# Patient Record
Sex: MALE | Race: WHITE | Employment: PART TIME | URBAN - METROPOLITAN AREA
[De-identification: names, ages, dates, MRNs, and addresses within clinical notes are randomized per-mention and may not be internally consistent; named-entity substitution may affect disease eponyms.]

---

## 2018-01-01 ENCOUNTER — TELEPHONE (OUTPATIENT)
Dept: INTERNAL MEDICINE CLINIC | Age: 63
End: 2018-01-01

## 2018-01-01 ENCOUNTER — OFFICE VISIT (OUTPATIENT)
Dept: INTERNAL MEDICINE CLINIC | Age: 63
End: 2018-01-01
Payer: MEDICAID

## 2018-01-01 VITALS
SYSTOLIC BLOOD PRESSURE: 132 MMHG | OXYGEN SATURATION: 97 % | WEIGHT: 315 LBS | DIASTOLIC BLOOD PRESSURE: 78 MMHG | TEMPERATURE: 98.1 F | HEART RATE: 92 BPM | BODY MASS INDEX: 67.15 KG/M2

## 2018-01-01 DIAGNOSIS — I87.2 VENOUS STASIS DERMATITIS OF RIGHT LOWER EXTREMITY: Primary | ICD-10-CM

## 2018-01-01 DIAGNOSIS — L97.909 VENOUS ULCER (HCC): ICD-10-CM

## 2018-01-01 DIAGNOSIS — L97.919 CHRONIC VENOUS HYPERTENSION W/ULCER AND INFLAMMATION INVOLV RIGHT SIDE (HCC): ICD-10-CM

## 2018-01-01 DIAGNOSIS — R60.0 EDEMA LEG: ICD-10-CM

## 2018-01-01 DIAGNOSIS — I83.009 VENOUS ULCER (HCC): ICD-10-CM

## 2018-01-01 DIAGNOSIS — R73.03 PREDIABETES: ICD-10-CM

## 2018-01-01 DIAGNOSIS — L97.909 STASIS ULCER OF LOWER EXTREMITY, UNSPECIFIED LATERALITY (HCC): ICD-10-CM

## 2018-01-01 DIAGNOSIS — I83.009 STASIS ULCER OF LOWER EXTREMITY, UNSPECIFIED LATERALITY (HCC): ICD-10-CM

## 2018-01-01 DIAGNOSIS — I87.331 CHRONIC VENOUS HYPERTENSION W/ULCER AND INFLAMMATION INVOLV RIGHT SIDE (HCC): ICD-10-CM

## 2018-01-01 PROCEDURE — 99213 OFFICE O/P EST LOW 20 MIN: CPT | Performed by: INTERNAL MEDICINE

## 2018-01-01 RX ORDER — FUROSEMIDE 40 MG/1
40 TABLET ORAL DAILY
Qty: 30 TABLET | Refills: 3 | Status: SHIPPED | OUTPATIENT
Start: 2018-01-01

## 2018-01-01 RX ORDER — CEPHALEXIN 500 MG/1
500 CAPSULE ORAL 3 TIMES DAILY
Qty: 30 CAPSULE | Refills: 0 | Status: SHIPPED | OUTPATIENT
Start: 2018-01-01 | End: 2018-01-01

## 2018-01-01 RX ORDER — POTASSIUM CHLORIDE 20 MEQ/1
20 TABLET, EXTENDED RELEASE ORAL DAILY
Qty: 30 TABLET | Refills: 3 | Status: SHIPPED | OUTPATIENT
Start: 2018-01-01

## 2018-01-01 ASSESSMENT — ENCOUNTER SYMPTOMS
COUGH: 0
SHORTNESS OF BREATH: 0
APNEA: 1
COLOR CHANGE: 1
GASTROINTESTINAL NEGATIVE: 1

## 2018-12-12 NOTE — TELEPHONE ENCOUNTER
marlene in [place] calling to clarify the cephALEXin (KEFLEX) 500 MG capsule rx. The quantity and directions don't match. Poonam Boyd wants for another provider to address this message since dr Jim Gross is out of the office.

## 2019-01-01 ENCOUNTER — HOSPITAL ENCOUNTER (EMERGENCY)
Age: 64
End: 2019-08-26
Attending: EMERGENCY MEDICINE

## 2019-01-01 ENCOUNTER — OFFICE VISIT (OUTPATIENT)
Dept: INTERNAL MEDICINE CLINIC | Age: 64
End: 2019-01-01

## 2019-01-01 ENCOUNTER — TELEPHONE (OUTPATIENT)
Dept: INTERNAL MEDICINE CLINIC | Age: 64
End: 2019-01-01

## 2019-01-01 VITALS
BODY MASS INDEX: 62.42 KG/M2 | HEART RATE: 88 BPM | OXYGEN SATURATION: 97 % | DIASTOLIC BLOOD PRESSURE: 80 MMHG | SYSTOLIC BLOOD PRESSURE: 136 MMHG | WEIGHT: 315 LBS | TEMPERATURE: 98.5 F

## 2019-01-01 VITALS — WEIGHT: 315 LBS | BODY MASS INDEX: 62.89 KG/M2

## 2019-01-01 DIAGNOSIS — I87.331 CHRONIC VENOUS HYPERTENSION W/ULCER AND INFLAMMATION INVOLV RIGHT SIDE (HCC): ICD-10-CM

## 2019-01-01 DIAGNOSIS — E66.3 OVERWEIGHT: ICD-10-CM

## 2019-01-01 DIAGNOSIS — I83.009 STASIS ULCER OF LOWER EXTREMITY, UNSPECIFIED LATERALITY (HCC): ICD-10-CM

## 2019-01-01 DIAGNOSIS — L97.919 CHRONIC VENOUS HYPERTENSION W/ULCER AND INFLAMMATION INVOLV RIGHT SIDE (HCC): ICD-10-CM

## 2019-01-01 DIAGNOSIS — L97.909 STASIS ULCER OF LOWER EXTREMITY, UNSPECIFIED LATERALITY (HCC): ICD-10-CM

## 2019-01-01 DIAGNOSIS — I46.9 CARDIAC ARREST (HCC): Primary | ICD-10-CM

## 2019-01-01 DIAGNOSIS — L97.909 VENOUS ULCER (HCC): ICD-10-CM

## 2019-01-01 DIAGNOSIS — L03.116 CELLULITIS OF LEFT LOWER EXTREMITY: Primary | ICD-10-CM

## 2019-01-01 DIAGNOSIS — I83.009 VENOUS ULCER (HCC): ICD-10-CM

## 2019-01-01 DIAGNOSIS — R73.03 PREDIABETES: ICD-10-CM

## 2019-01-01 DIAGNOSIS — I87.2 VENOUS STASIS DERMATITIS OF LEFT LOWER EXTREMITY: ICD-10-CM

## 2019-01-01 PROCEDURE — 82962 GLUCOSE BLOOD TEST: CPT

## 2019-01-01 PROCEDURE — 99285 EMERGENCY DEPT VISIT HI MDM: CPT

## 2019-01-01 PROCEDURE — 6360000002 HC RX W HCPCS: Performed by: EMERGENCY MEDICINE

## 2019-01-01 PROCEDURE — 2580000003 HC RX 258: Performed by: EMERGENCY MEDICINE

## 2019-01-01 PROCEDURE — 2500000003 HC RX 250 WO HCPCS: Performed by: EMERGENCY MEDICINE

## 2019-01-01 PROCEDURE — 92950 HEART/LUNG RESUSCITATION CPR: CPT

## 2019-01-01 PROCEDURE — 99214 OFFICE O/P EST MOD 30 MIN: CPT | Performed by: INTERNAL MEDICINE

## 2019-01-01 RX ORDER — DEXTROSE MONOHYDRATE 25 G/50ML
INJECTION, SOLUTION INTRAVENOUS DAILY PRN
Status: DISCONTINUED | OUTPATIENT
Start: 2019-01-01 | End: 2019-01-01 | Stop reason: HOSPADM

## 2019-01-01 RX ORDER — DOXYCYCLINE HYCLATE 100 MG
100 TABLET ORAL 2 TIMES DAILY
Qty: 60 TABLET | Refills: 0 | Status: SHIPPED | OUTPATIENT
Start: 2019-01-01 | End: 2019-01-01

## 2019-01-01 RX ADMIN — EPINEPHRINE 1 MG: 0.1 INJECTION INTRACARDIAC; INTRAVENOUS at 15:33

## 2019-01-01 RX ADMIN — EPINEPHRINE 1 MG: 0.1 INJECTION INTRACARDIAC; INTRAVENOUS at 15:28

## 2019-01-01 RX ADMIN — DEXTROSE MONOHYDRATE 25 G: 25 INJECTION, SOLUTION INTRAVENOUS at 15:31

## 2019-01-01 RX ADMIN — SODIUM BICARBONATE 50 MEQ: 84 INJECTION INTRAVENOUS at 15:33

## 2019-01-01 ASSESSMENT — ENCOUNTER SYMPTOMS
COUGH: 0
APNEA: 1
COLOR CHANGE: 1

## 2019-08-26 NOTE — ED NOTES
Received a call from brother Mamie Rivera ( [phone]), now speaking with Dr. Susu Todd, notified of patient's death     Kiersten Baker RN  08/26/19 [id number]
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
## 2019-08-27 LAB
GLUCOSE BLD-MCNC: 67 MG/DL (ref 70–99)
PERFORMED ON: ABNORMAL